# Patient Record
Sex: FEMALE | Race: WHITE
[De-identification: names, ages, dates, MRNs, and addresses within clinical notes are randomized per-mention and may not be internally consistent; named-entity substitution may affect disease eponyms.]

---

## 2017-01-12 ENCOUNTER — HOSPITAL ENCOUNTER (OUTPATIENT)
Dept: HOSPITAL 61 - SURG | Age: 57
Discharge: HOME | End: 2017-01-12
Attending: SURGERY
Payer: MEDICARE

## 2017-01-12 VITALS — BODY MASS INDEX: 21.83 KG/M2 | WEIGHT: 131 LBS | HEIGHT: 65 IN

## 2017-01-12 VITALS
DIASTOLIC BLOOD PRESSURE: 62 MMHG | SYSTOLIC BLOOD PRESSURE: 126 MMHG | SYSTOLIC BLOOD PRESSURE: 126 MMHG | DIASTOLIC BLOOD PRESSURE: 62 MMHG

## 2017-01-12 DIAGNOSIS — Z87.39: ICD-10-CM

## 2017-01-12 DIAGNOSIS — E03.9: ICD-10-CM

## 2017-01-12 DIAGNOSIS — Z72.89: ICD-10-CM

## 2017-01-12 DIAGNOSIS — J45.909: ICD-10-CM

## 2017-01-12 DIAGNOSIS — F17.210: ICD-10-CM

## 2017-01-12 DIAGNOSIS — K43.6: Primary | ICD-10-CM

## 2017-01-12 DIAGNOSIS — J44.9: ICD-10-CM

## 2017-01-12 PROCEDURE — C1781 MESH (IMPLANTABLE): HCPCS

## 2017-01-12 PROCEDURE — 49653: CPT

## 2017-01-12 RX ADMIN — MORPHINE SULFATE PRN MG: 2 INJECTION, SOLUTION INTRAMUSCULAR; INTRAVENOUS at 10:55

## 2017-01-12 RX ADMIN — HYDROMORPHONE HYDROCHLORIDE PRN MG: 2 INJECTION INTRAMUSCULAR; INTRAVENOUS; SUBCUTANEOUS at 11:30

## 2017-01-12 RX ADMIN — MORPHINE SULFATE PRN MG: 2 INJECTION, SOLUTION INTRAMUSCULAR; INTRAVENOUS at 10:31

## 2017-01-12 RX ADMIN — MORPHINE SULFATE PRN MG: 2 INJECTION, SOLUTION INTRAMUSCULAR; INTRAVENOUS at 10:36

## 2017-01-12 RX ADMIN — HYDROMORPHONE HYDROCHLORIDE PRN MG: 2 INJECTION INTRAMUSCULAR; INTRAVENOUS; SUBCUTANEOUS at 11:19

## 2017-01-12 NOTE — PDOC
BRIEF OPERATIVE NOTE


Date:  Jan 12, 2017


Pre-Op Diagnosis


Incarcerated ventral hernia


Post-Op Diagnosis


Same


Procedure Performed


Robotic assisted L/S Ventral hernia repair with Mesh


Surgeon


Maury


Anesthesia Type:  General


Blood Loss


5ml


Specimens Obtained


None


Findings


as above


Complications


None








RANDY VELAZQUEZ MD Jan 12, 2017 09:57

## 2017-01-12 NOTE — DISCH
DISCHARGE INSTRUCTIONS


Condition on Discharge


Condition on Discharge:  Stable





Activity After Discharge


Activity Instructions for Disc:  Avoid exertion


Other activity instructions:  No lifting >20lbs for 2 weeks





Diet after Discharge


Diet after Discharge:  Regular





Wound Incision Care


Other wound/incision instructi:  May shower in 24 hours





Contacting the  after DC


Call your doctor for:  If your condition worsens





Follow-Up


Follow up with:  Dr Velazquez in 2 weeks








RANDY VELAZQUEZ MD Jan 12, 2017 09:58

## 2017-01-12 NOTE — OP
DATE OF SURGERY:  01/12/2017



PREOPERATIVE DIAGNOSIS:  Incarcerated ventral hernia.



POSTOPERATIVE DIAGNOSIS:  Incarcerated ventral hernia.



NAME OF THE PROCEDURE:  Robotic-assisted laparoscopic ventral hernia repair with

mesh.



SURGEON:  Tommie Velazquez MD



INDICATIONS FOR THE PROCEDURE:  The patient is a 56-year-old female who has

complained of a bulge just above the umbilicus, becoming more painful and

larger.  Procedure of robotic-assisted laparoscopic ventral hernia repair was

explained to the patient in detail.  Risks and benefits were also discussed

including bleeding, infection, injury to intraabdominal contents, possibly

necessitating further open operations.  Alternatives of this procedure were also

discussed with the patient who seemed to understand and gave verbal and written

consent to have the procedure performed.



DESCRIPTION OF PROCEDURE:  The patient was taken to the operating room and

placed in the supine position.  General anesthesia was initiated.  Once the

patient was asleep and intubated, her abdomen was prepped and draped in the

usual sterile fashion using ChloraPrep.  An area in the left upper quadrant just

below the costal margin was injected with 0.25% Marcaine with epinephrine. 

Small incision was made with an 11 blade scalpel and a 5 mm Visiport was placed

under direct visualization into the abdomen.  Pneumoperitoneum was then

achieved.  The 5 mm camera was placed within the abdomen and the abdomen was

inspected.  No other abnormalities were noted.  It was noted there was about a 4

cm diameter hernia just above the umbilicus, incarcerated with omentum.  At this

point, three 8.5 mm da Rosemary ports were placed under direct visualization, 1 in

the left mid abdomen, 1 in the left lower abdomen and 1 in the left upper

abdomen.  At this point, the da Rosemary robot was brought in and docked at all

ports.  Camera port was placed and a grasper and EndoShears scissors were

placed.  Surgeon went to the da Rosemary console.  Using a grasper and EndoShears,

the adherent tissues to the hernia were taken down.  The hernia defect was then

closed with a nonabsorbable 2-0 V-Loc suture.  A VersaLite mesh was cut to the

diameter, 6 cm were placed over the defect.  This was sewn in place with a

running 2-0 V-Loc absorbable suture.  Once this was complete, the ports were

removed and pneumoperitoneum was reduced and the incisions were closed with a

4-0 subcuticular Monocryl.  Thierno Salazari-Strips, 4x4s and Medipore tape were

applied as dressings.  The patient was awakened, extubated in the operating

room, taken to recovery in stable condition.  All sponge and instrument counts

listed as correct.  Estimated blood loss 5 mL.

 



______________________________

TOMMIE VELAZQUEZ MD



DR:  MARK ANTHONY/marii  JOB#:  [id number] / [id number]

DD:  01/12/2017 09:56  DT:  01/12/2017 13:26



CHELSEA Lee

## 2019-04-15 ENCOUNTER — HOSPITAL ENCOUNTER (OUTPATIENT)
Dept: HOSPITAL 61 - MRI | Age: 59
Discharge: HOME | End: 2019-04-15
Payer: MEDICARE

## 2019-04-15 DIAGNOSIS — M41.86: ICD-10-CM

## 2019-04-15 DIAGNOSIS — M25.78: ICD-10-CM

## 2019-04-15 DIAGNOSIS — M51.35: Primary | ICD-10-CM

## 2019-04-15 DIAGNOSIS — M71.38: ICD-10-CM

## 2019-04-15 DIAGNOSIS — M47.816: ICD-10-CM

## 2019-04-15 DIAGNOSIS — M43.17: ICD-10-CM

## 2019-04-15 PROCEDURE — 72148 MRI LUMBAR SPINE W/O DYE: CPT

## 2019-04-15 NOTE — RAD
MRI Lumbar Spine without contrast

 

History: Worsening chronic low back pain bilaterally

 

Technique: Multiplanar, multi sequential noncontrast MR imaging was 

performed of the lumbar spine.

 

Comparison: None available at this time

 

Findings: 

There is minimal grade 1 anterior spondylolisthesis L5-S1 and L2-3, 

minimal posterior subluxation L1 relative to L2. Lumbar vertebral body 

stature is overall maintained. There is mild dextroscoliosis centered near

L2. There is very mild right lateral subluxation of L2 relative L3. There 

is variable fairly advanced degenerative disc disease at L1-L2 and L2-3, 

on the right at L3-4, and greater posteriorly at L4-5. There is mild 

degenerative disc disease at L5-S1 and moderate degenerative disc disease 

at T12-L1. Conus terminates at L1.  There is multilevel mild endplate 

edema greatest at L1-L2.

 

T12-L1: Spinal canal and neural foramina are adequate.

 

L1-L2:  There is minimal disc osteophyte complex greater in the far left 

lateral recess. There is mild narrowing of the left neural foramen, right 

neural foramen adequate. There is mild facet hypertrophic change.

 

L2-L3:  There is mild buckling of the ligamentum flavum and 

mild-to-moderate right greater than left facet degenerative change. There 

is mild deformity of the thecal sac. There is variable mild narrowing of 

the far lateral recesses bilaterally somewhat greater on the right. There 

is mild posterior narrowing of the left neural foramen, right neural 

foramen adequate.

 

L3-L4:  There is mild to moderate facet degenerative change greater on the

right. There is negligible disc osteophyte complex greater in the right 

lateral recess and inferior right neural foramen. Neural foramina are 

overall adequate. Spinal canal is adequate.

 

L4-L5:  There is mild buckling of the ligamentum flavum and facet 

hypertrophic change. There is negligible disc osteophyte complex and 

bulge. Spinal canal is overall adequate. Neural foramina are adequate.

 

L5-S1:  There is moderate facet hypertrophic change. There is a synovial 

cyst along the lateral left facet articulation, does not protrude into the

spinal canal. There is negligible bulge. Spinal canal is adequate. Neural 

foramina are adequate.

 

Impression: 

 

1.  There is multilevel mild abnormal alignment as stated. There is mild 

dextroscoliosis. There is multilevel facet degenerative change. There is 

multilevel fairly advanced degenerative disc disease. There is no 

significant lumbar spinal stenosis, variable mild narrowing of the far 

lateral recesses bilaterally at L2-3. There is no significant lumbar 

neural foramina compromise.

 

Electronically signed by: Helio Rosen MD (4/15/2019 4:11 PM) 

[facility]

## 2019-10-22 ENCOUNTER — HOSPITAL ENCOUNTER (EMERGENCY)
Dept: HOSPITAL 63 - ER | Age: 59
Discharge: HOME | End: 2019-10-22
Payer: MEDICARE

## 2019-10-22 VITALS — HEIGHT: 64 IN | BODY MASS INDEX: 18.44 KG/M2 | WEIGHT: 108 LBS

## 2019-10-22 VITALS — SYSTOLIC BLOOD PRESSURE: 128 MMHG | DIASTOLIC BLOOD PRESSURE: 71 MMHG

## 2019-10-22 DIAGNOSIS — M79.671: ICD-10-CM

## 2019-10-22 DIAGNOSIS — M06.9: ICD-10-CM
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
## 2019-10-22 DIAGNOSIS — S16.1XXA: Primary | ICD-10-CM

## 2019-10-22 DIAGNOSIS — M19.90: ICD-10-CM

## 2019-10-22 DIAGNOSIS — J44.9: ICD-10-CM

## 2019-10-22 DIAGNOSIS — S09.8XXA: ICD-10-CM
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
## 2019-10-22 PROCEDURE — 94640 AIRWAY INHALATION TREATMENT: CPT

## 2019-10-22 PROCEDURE — 70450 CT HEAD/BRAIN W/O DYE: CPT

## 2019-10-22 PROCEDURE — 72125 CT NECK SPINE W/O DYE: CPT

## 2019-10-22 PROCEDURE — 73630 X-RAY EXAM OF FOOT: CPT

## 2019-10-22 PROCEDURE — 99284 EMERGENCY DEPT VISIT MOD MDM: CPT

## 2019-10-22 NOTE — RAD
Exam: Right foot 3 views

 

INDICATION: Fall, history of fracture and arthritis many years ago

 

TECHNIQUE: Frontal, lateral and oblique views of the right foot

 

Comparisons: None

 

FINDINGS:

There is severe degenerative change at the first TMT and second TMT 

joints. Moderate overlying soft tissue swelling at the forefoot, with 

irregular mineralization of the underlying tarsal metatarsal joints. No 

displaced fractures are identified. Joint spaces are otherwise 

well-maintained.

 

IMPRESSION:

Soft tissue swelling overlying the first and second metatarsal, with 

severe degenerative changes at the underlying joints. A displaced fracture

is not identified however one is difficult to exclude on radiographs. 

Recommend comparison to any prior imaging if available versus progress 

radiographs to assess for healing changes.

 

Electronically signed by: Lexx Jordan MD (10/22/2019 3:39 PM) [facility]

## 2019-10-22 NOTE — RAD
Exam: CT head and cervical spine

 

INDICATION: Fall

 

TECHNIQUE: Sequential axial images through the head and cervical spine 

were obtained without the administration of IV contrast.

 

Comparisons: None

 

FINDINGS:

 

Head:

No focal parenchymal lesion or hemorrhage is identified. There is no 

midline shift or sulcal effacement.

 

No acute vascular territory infarction is identified. Gray-white 

distinction is preserved.

 

The ventricular system is within normal limits without compression 

hydrocephalus. The basal cisterns are well maintained.

 

Extra cranial soft tissue contusion overlying the left temporal region 

over the zygomatic arch. The visualized portions of the paranasal sinuses 

and mastoid air cells are well-pneumatized. No acute fractures.

 

Cervical spine:

Vertebral body heights are well-maintained. There is a grade 1 

anterolisthesis of C2 on C3. Grade 1 anterolisthesis of C6 on C7.

 

Fracture to the cervical spine is not identified.

 

Multilevel spondylotic change in the cervical spine with degenerative disc

disease greatest at C3-C4, C4-C5 and C5-C6.

 

Visualized paraspinal soft tissues are unremarkable.

 

IMPRESSION:

1.  Extracranial soft tissue contusion overlying the left temporal region 

without underlying osseous or intracranial abnormality.

2.  Negative CT C-spine for acute traumatic injury.

 

Exposure: One or more of the following in the visualized dose reduction 

techniques were utilized for this examination:

1.  Automated exposure control

2.  Adjustment of the MA and/or KV according to patient size

Use of iterative of reconstructive technique

 

Electronically signed by: Lexx Jordan MD (10/22/2019 3:24 PM) [facility]

## 2019-10-22 NOTE — PHYS DOC
Past History


Past Medical History:  Arthritis, Asthma, COPD, Other


Additional Past Medical Histor:  something pt calls "precurser to parkinsons" 

caused by gabapentin


Past Surgical History:  No Surgical History


Alcohol Use:  Occasionally


Drug Use:  None





Adult General


Chief Complaint


Chief Complaint:  MECHANICAL FALL





HPI


HPI





Patient is a 59-year-old female who arrives via EMS with report of fall with 

head injury. Patient denies any loss of consciousness but did strike her head. 

She also complains of neck pain. Patient rates pain to be a 9 out of 10. Patient

also indicates that she has pain in her right foot that is more than usual but 

does not think that she injured her foot. She does have a history of rheumatoid 

arthritis. Patient states that nothing is improving her symptoms.[]





Review of Systems


Review of Systems





Constitutional: Denies fever or chills []


Eyes: Denies change in visual acuity, redness, or eye pain []


HENT: Denies nasal congestion or sore throat []


Respiratory: Denies cough or shortness of breath []


Cardiovascular: No additional information not addressed in HPI []


GI: Denies abdominal pain, nausea, vomiting, bloody stools or diarrhea []


: Denies dysuria or hematuria []


Musculoskeletal: Denies back pain or joint pain []


Integument: Denies rash or skin lesions []


Neurologic: Denies headache, focal weakness or sensory changes []


Endocrine: Denies polyuria or polydipsia []





All other systems were reviewed and found to be within normal limits, except as 

documented in this note.





Current Medications


Current Medications





Current Medications








 Medications


  (Trade)  Dose


 Ordered  Sig/Chloe  Start Time


 Stop Time Status Last Admin


Dose Admin


 


 Albuterol Sulfate


  (Ventolin)  2.5 mg  1X  ONCE  10/22/19 14:45


 10/22/19 14:56 DC 10/22/19 14:45


2.5 MG











Allergies


Allergies





Allergies








Coded Allergies Type Severity Reaction Last Updated Verified


 


  No Known Drug Allergies    10/22/19 No











Physical Exam


Physical Exam





Constitutional: Well developed, well nourished, no acute distress, non-toxic 

appearance. []


HENT: Normocephalic, atraumatic, bilateral external ears normal, oropharynx 

moist, no oral exudates, nose normal. []


Eyes: PERRLA, EOMI, conjunctiva normal, no discharge. [] 


Neck: Normal range of motion, no tenderness, supple, no stridor. [] 


Cardiovascular:Heart rate regular rhythm, no murmur []


Lungs & Thorax:  Bilateral breath sounds clear to auscultation []


Abdomen: Bowel sounds normal, soft, no tenderness, no masses, no pulsatile 

masses. [] 


Skin: Warm, dry, no erythema, no rash. [] 


Back: No tenderness, no CVA tenderness. [] 


Extremities: No tenderness, no cyanosis, no clubbing, ROM intact, no edema. [] 


Neurologic: Alert and oriented X 3, normal motor function, normal sensory 

function, no focal deficits noted. []


Psychologic: Affect normal, judgement normal, mood normal. []





Current Patient Data


Vital Signs





                                   Vital Signs








  Date Time  Temp Pulse Resp B/P (MAP) Pulse Ox O2 Delivery O2 Flow Rate FiO2


 


10/22/19 15:22  81 18 126/66 (86) 96 Room Air  


 


10/22/19 14:34 97.9       











EKG


EKG


[]





Radiology/Procedures


Radiology/Procedures


[]





Course & Med Decision Making


Course & Med Decision Making


Pertinent Labs and Imaging studies reviewed. (See chart for details)





[]





Dragon Disclaimer


Dragon Disclaimer


This electronic medical record was generated, in whole or in part, using a voice

 recognition dictation system.





Departure


Departure:


Impression:  


   Primary Impression:  


   Closed head injury


   Additional Impression:  


   Cervical myofascial strain


Disposition:  01 HOME, SELF-CARE


Condition:  STABLE


Referrals:  


CHELSEA SWANSON (PCP)


Patient Instructions:  Cervical Sprain, Form - Excuse from Work, School, or 

Physical Activity, Head Injury, Adult


Scripts


Orphenadrine Citrate (ORPHENADRINE CITRATE) 100 Mg Tablet.er


1 TAB PO BID PRN for MUSCLE SPASMS, #14 TAB


   Prov: HONORIO WALSH Jr. DO         10/22/19 


Acetaminophen With Codeine (TYLENOL WITH CODEINE #3 TABLET) 1 Each Tablet


1 TAB PO PRN Q6HRS PRN for pain MDD 4 Tablet(s), #12 TAB 0 Refills


   Prov: HONORIO WALSH Jr. DO         10/22/19





Problem Qualifiers








   Primary Impression:  


   Closed head injury


   Encounter type:  initial encounter  Qualified Codes:  S09.90XA - Unspecified 

   injury of head, initial encounter


   Additional Impression:  


   Cervical myofascial strain


   Encounter type:  initial encounter  Qualified Codes:  S16.1XXA - Strain of 

   muscle, fascia and tendon at neck level, initial encounter








HONORIO WALSH Jr. DO          Oct 22, 2019 16:25

## 2019-10-31 ENCOUNTER — HOSPITAL ENCOUNTER (EMERGENCY)
Dept: HOSPITAL 63 - ER | Age: 59
Discharge: HOME | End: 2019-10-31
Payer: MEDICARE

## 2019-10-31 VITALS — DIASTOLIC BLOOD PRESSURE: 89 MMHG | SYSTOLIC BLOOD PRESSURE: 146 MMHG

## 2019-10-31 VITALS — BODY MASS INDEX: 18.33 KG/M2 | WEIGHT: 107.37 LBS | HEIGHT: 64 IN

## 2019-10-31 DIAGNOSIS — M25.512: ICD-10-CM
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
## 2019-10-31 DIAGNOSIS — R74.8: ICD-10-CM
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
## 2019-10-31 DIAGNOSIS — F15.90: ICD-10-CM

## 2019-10-31 DIAGNOSIS — G89.29: ICD-10-CM

## 2019-10-31 DIAGNOSIS — Z91.81: ICD-10-CM

## 2019-10-31 DIAGNOSIS — R73.02: ICD-10-CM

## 2019-10-31 DIAGNOSIS — M25.572: ICD-10-CM

## 2019-10-31 DIAGNOSIS — M19.90: ICD-10-CM

## 2019-10-31 DIAGNOSIS — E87.6: ICD-10-CM

## 2019-10-31 DIAGNOSIS — N39.0: ICD-10-CM

## 2019-10-31 DIAGNOSIS — M79.672: ICD-10-CM
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
## 2019-10-31 DIAGNOSIS — F17.210: ICD-10-CM

## 2019-10-31 DIAGNOSIS — J44.9: ICD-10-CM

## 2019-10-31 DIAGNOSIS — F11.90: ICD-10-CM
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
## 2019-10-31 DIAGNOSIS — S05.12XA: Primary | ICD-10-CM

## 2019-10-31 LAB
ALBUMIN SERPL-MCNC: 3.6 G/DL (ref 3.4–5)
ALBUMIN/GLOB SERPL: 1.1 {RATIO} (ref 1–1.7)
ALP SERPL-CCNC: 125 U/L (ref 46–116)
ALT SERPL-CCNC: 27 U/L (ref 14–59)
AMPHETAMINE/METHAMPHETAMINE: (no result)
ANION GAP SERPL CALC-SCNC: 9 MMOL/L (ref 6–14)
APTT PPP: YELLOW S
AST SERPL-CCNC: 23 U/L (ref 15–37)
BACTERIA #/AREA URNS HPF: (no result) /HPF
BARBITURATES UR-MCNC: (no result) UG/ML
BASOPHILS # BLD AUTO: 0.1 X10^3/UL (ref 0–0.2)
BASOPHILS NFR BLD: 1 % (ref 0–3)
BENZODIAZ UR-MCNC: (no result) UG/L
BILIRUB SERPL-MCNC: 0.5 MG/DL (ref 0.2–1)
BILIRUB UR QL STRIP: (no result)
BUN/CREAT SERPL: 14 (ref 6–20)
CA-I SERPL ISE-MCNC: 11 MG/DL (ref 7–20)
CALCIUM SERPL-MCNC: 8.5 MG/DL (ref 8.5–10.1)
CANNABINOIDS UR-MCNC: (no result) UG/L
CHLORIDE SERPL-SCNC: 98 MMOL/L (ref 98–107)
CO2 SERPL-SCNC: 30 MMOL/L (ref 21–32)
COCAINE UR-MCNC: (no result) NG/ML
CREAT SERPL-MCNC: 0.8 MG/DL (ref 0.6–1)
EOSINOPHIL NFR BLD: 0.3 X10^3/UL (ref 0–0.7)
EOSINOPHIL NFR BLD: 4 % (ref 0–3)
ERYTHROCYTE [DISTWIDTH] IN BLOOD BY AUTOMATED COUNT: 15.1 % (ref 11.5–14.5)
FIBRINOGEN PPP-MCNC: (no result) MG/DL
GFR SERPLBLD BASED ON 1.73 SQ M-ARVRAT: 73.4 ML/MIN
GLOBULIN SER-MCNC: 3.3 G/DL (ref 2.2–3.8)
GLUCOSE SERPL-MCNC: 120 MG/DL (ref 70–99)
GLUCOSE UR STRIP-MCNC: (no result) MG/DL
HCT VFR BLD CALC: 38.7 % (ref 36–47)
HGB BLD-MCNC: 12.8 G/DL (ref 12–15.5)
LIPASE: 72 U/L (ref 73–393)
LYMPHOCYTES # BLD: 1.4 X10^3/UL (ref 1–4.8)
LYMPHOCYTES NFR BLD AUTO: 17 % (ref 24–48)
MAGNESIUM SERPL-MCNC: 2 MG/DL (ref 1.8–2.4)
MCH RBC QN AUTO: 30 PG (ref 25–35)
MCHC RBC AUTO-ENTMCNC: 33 G/DL (ref 31–37)
MCV RBC AUTO: 91 FL (ref 79–100)
METHADONE SERPL-MCNC: (no result) NG/ML
MONO #: 0.6 X10^3/UL (ref 0–1.1)
MONOCYTES NFR BLD: 8 % (ref 0–9)
NEUT #: 5.8 X10^3UL (ref 1.8–7.7)
NEUTROPHILS NFR BLD AUTO: 70 % (ref 31–73)
NITRITE UR QL STRIP: (no result)
OPIATES UR-MCNC: (no result) NG/ML
PCP SERPL-MCNC: (no result) MG/DL
PLATELET # BLD AUTO: 385 X10^3/UL (ref 140–400)
POTASSIUM SERPL-SCNC: 3.4 MMOL/L (ref 3.5–5.1)
PROT SERPL-MCNC: 6.9 G/DL (ref 6.4–8.2)
RBC # BLD AUTO: 4.27 X10^6/UL (ref 3.5–5.4)
RBC #/AREA URNS HPF: (no result) /HPF (ref 0–2)
SODIUM SERPL-SCNC: 137 MMOL/L (ref 136–145)
SP GR UR STRIP: <=1.005
SQUAMOUS #/AREA URNS LPF: (no result) /LPF
UROBILINOGEN UR-MCNC: 0.2 MG/DL
WBC # BLD AUTO: 8.2 X10^3/UL (ref 4–11)
WBC #/AREA URNS HPF: (no result) /HPF (ref 0–4)

## 2019-10-31 PROCEDURE — 72125 CT NECK SPINE W/O DYE: CPT

## 2019-10-31 PROCEDURE — 96375 TX/PRO/DX INJ NEW DRUG ADDON: CPT

## 2019-10-31 PROCEDURE — 81001 URINALYSIS AUTO W/SCOPE: CPT

## 2019-10-31 PROCEDURE — 84484 ASSAY OF TROPONIN QUANT: CPT

## 2019-10-31 PROCEDURE — 85610 PROTHROMBIN TIME: CPT

## 2019-10-31 PROCEDURE — 80053 COMPREHEN METABOLIC PANEL: CPT

## 2019-10-31 PROCEDURE — 71046 X-RAY EXAM CHEST 2 VIEWS: CPT

## 2019-10-31 PROCEDURE — 36415 COLL VENOUS BLD VENIPUNCTURE: CPT

## 2019-10-31 PROCEDURE — 96376 TX/PRO/DX INJ SAME DRUG ADON: CPT

## 2019-10-31 PROCEDURE — 87086 URINE CULTURE/COLONY COUNT: CPT

## 2019-10-31 PROCEDURE — 83880 ASSAY OF NATRIURETIC PEPTIDE: CPT

## 2019-10-31 PROCEDURE — 99285 EMERGENCY DEPT VISIT HI MDM: CPT

## 2019-10-31 PROCEDURE — 96365 THER/PROPH/DIAG IV INF INIT: CPT

## 2019-10-31 PROCEDURE — 83735 ASSAY OF MAGNESIUM: CPT

## 2019-10-31 PROCEDURE — 80307 DRUG TEST PRSMV CHEM ANLYZR: CPT

## 2019-10-31 PROCEDURE — 83690 ASSAY OF LIPASE: CPT

## 2019-10-31 PROCEDURE — 85730 THROMBOPLASTIN TIME PARTIAL: CPT

## 2019-10-31 PROCEDURE — 85025 COMPLETE CBC W/AUTO DIFF WBC: CPT

## 2019-10-31 PROCEDURE — 73030 X-RAY EXAM OF SHOULDER: CPT

## 2019-10-31 PROCEDURE — 70450 CT HEAD/BRAIN W/O DYE: CPT

## 2019-10-31 RX ADMIN — MORPHINE SULFATE PRN MG: 2 INJECTION, SOLUTION INTRAMUSCULAR; INTRAVENOUS at 17:41

## 2019-10-31 RX ADMIN — MORPHINE SULFATE PRN MG: 2 INJECTION, SOLUTION INTRAMUSCULAR; INTRAVENOUS at 20:24

## 2019-10-31 NOTE — RAD
Study: SHOULDER 2+V LEFT

 

Indication: Pain after a fall.

 

Comparison: None.

 

Findings:

 

Upper limits of normal width of the AC joint. Soft tissue fullness within 

the AC joint itself as well as extending above the AC joint.

 

The humeral head is high riding but not dislocated. No acute fracture. 

 

Impression:

 

1. No acute fracture.

2. High riding humeral head which approaches the undersurface of the 

acromion. Recommend correlation for symptoms of rotator cuff deficiency.

3. Borderline widened AC joint with surrounding soft tissue fullness. This

could be secondary to sprain or be degenerative in etiology with capsular 

hypertrophy.

 

Electronically signed by: JASON CHANG MD (10/31/2019 6:29 PM) [facility]

## 2019-10-31 NOTE — RAD
STUDY: CT head and cervical spine without contrast

 

INDICATION: Pain after a fall.

 

COMPARISON: CT head and cervical spine from 10/22/2019

 

TECHNIQUE: Axial CT imaging through the head and cervical spine without 

the use of intravenous contrast. Sagittal and coronal reformats were 

obtained.

 

One or more of the following individualized dose reduction techniques were

utilized for this examination:  

 

1. Automated exposure control

2. Adjustment of the mA and/or kV according to patient size

3. Use of iterative reconstruction technique.

 

FINDINGS:

 

CT head:

 

Previously noted contusion lateral to the left orbit has decreased in 

size. No newly seen scalp abnormality and the orbits/globes are 

unremarkable. The calvarium is intact. The visualized paranasal sinuses 

are well aerated. The mastoid air cells and middle ears are normally 

aerated as well.

 

No acute intracranial hemorrhage. No mass effect, midline shift or 

hydrocephalus. Gray-white matter differentiation is maintained. 

Redemonstrated patchy low-attenuation such as seen in the left frontal 

subcortical white matter.

 

CT cervical spine:

 

No acute fracture seen throughout the cervical or upper thoracic spine. 

Alignment is maintained at the craniocervical and atlantoaxial 

articulations. The dens is intact.

 

Unchanged grade 1 anterolisthesis of C6 on C7. Multilevel discogenic 

arthrosis most advanced from C3-C4 through C5-C6. Multilevel facet 

degeneration, uncovertebral joint hypertrophy and disc osteophyte complex 

formation. This results in multiple levels with bony neural foraminal 

encroachment ranging mostly from mild to moderate. Central canal 

encroachment is again seen to be most pronounced at C3-C4 and moderate in 

severity.

 

No prevertebral edema.

 

IMPRESSION:

 

CT head:

1.  No acute intracranial abnormality. Previously seen contusion lateral 

to the left orbit has decreased in size.

2.  Nonspecific white matter findings most notable at the left frontal 

lobe which likely represent the sequela of chronic microvascular ischemic 

change.

 

CT cervical spine:

1.  No acute fracture. No change in alignment relative to the 10/22/2019 

comparison.

2.  Unchanged cervical spondylosis with suspected moderate central canal 

stenosis at C3-C4 and multiple levels with mild and moderate bony neural 

foraminal encroachment.

 

Electronically signed by: JASON CHANG MD (10/31/2019 6:18 PM) [facility]

## 2019-10-31 NOTE — PHYS DOC
Past History


Past Medical History:  Arthritis, Asthma, COPD, Other


Additional Past Medical Histor:  something pt calls "precurser to parkinsons" 

caused by gabapentin


 (VIVI MARTINES DO)


Past Surgical History:  No Surgical History


 (VIVI MARTINES DO)


Smoking:  Cigarettes


Alcohol Use:  Occasionally


Drug Use:  None


 (VIVI MARTINES DO)





Adult General


Chief Complaint


Chief Complaint:  MECHANICAL FALL





HPI


HPI





Patient is a 59-year-old female presents with left year, left head, left neck, 

and left shoulder pain after a fall 2-3 days ago. Patient has frequent falls as 

a long-standing side effect from gabapentin. She notes that she has been feeling

increasingly weak over these past several days as well. Patient was seen 

approximately a week and a half ago for another fall where she injured the left 

side of her head. Imaging performed at that time was negative and she was 

discharged to home. She continues to have the previous pain also in her foot and

ankle which is not significantly improved over time. She denies any chest pain 

or palpitations.[]


 (VIVI MARTINES DO)





Review of Systems


Review of Systems





Constitutional: Denies fever or chills []


Eyes: Denies change in visual acuity, redness, or eye pain []


HENT: Denies nasal congestion or sore throat []


Respiratory: Denies cough or shortness of breath []


Cardiovascular: No chest pain or palpitations[]


GI: Denies abdominal pain, nausea, vomiting, bloody stools or diarrhea []


: Denies dysuria or hematuria []


Musculoskeletal: Denies back pain see history of present illness[]


Integument: Denies rash or skin lesions []


Neurologic: Denies focal weakness or sensory changes, see history of present 

illness []


Endocrine: Denies polyuria or polydipsia []





All other systems were reviewed and found to be within normal limits, except as 

documented in this note.


 (VIVI MARTINES DO)





Allergies


Allergies





Allergies








Coded Allergies Type Severity Reaction Last Updated Verified


 


  No Known Drug Allergies    10/22/19 No








 (VIVI MARTINES DO)





Physical Exam


Physical Exam





Constitutional: Well developed, well nourished, no acute distress, non-toxic 

appearance. []


HENT: Normocephalic, bruising on the left side of her face. TMs are clear, no 

blood or fluid, no Crockett sign, bilateral external ears normal, oropharynx 

moist, no oral exudates, nose normal. []


Eyes: PERRLA, EOMI, conjunctiva normal, no discharge. [] 


Neck: Normal range of motion, no midline tenderness, tenderness in the left-si

ded cervical paraspinal musculature., supple, no stridor. [] 


Cardiovascular:Heart rate regular rhythm, no murmur []


Lungs & Thorax:  Bilateral breath sounds clear to auscultation []


Abdomen: Bowel sounds normal, soft, no tenderness, no masses, no pulsatile 

masses. [] 


Skin: Warm, dry, no erythema, no rash. [] 


Back: No tenderness, no CVA tenderness. [] 


Extremities: Left shoulder has diffuse tenderness to palpation. Decreased active

 range of motion. She is distally neurovascularly intact. No pain with axial 

load of the humerus. Full active range of motion of the wrist and fingers and 

elbow.


Mild tenderness of the left foot and ankle region. Patient reports that this is 

unchanged from last visit.


The other 2 extremities show: No tenderness, no cyanosis, no clubbing, ROM 

intact, no edema. [] 


Neurologic: Alert and oriented X 3, normal motor function, normal sensory 

function, no focal deficits noted. Resting twitching/tremor noted. []


Psychologic: Affect normal, judgement normal, mood normal. []


 ([facility])





Current Patient Data


Vital Signs





                                   Vital Signs








  Date Time  Temp Pulse Resp B/P (MAP) Pulse Ox O2 Delivery O2 Flow Rate FiO2


 


10/31/19 16:45 98.7 82 20  98 Room Air  








 ([facility])





EKG


EKG


EKG shows a supraventricular rhythm/sinus rhythm at 72 bpm, normal axis, QTC is 

elevated at 497 ms. This was interpreted by me at 1749. No ST elevation.[]


 ([facility] )





Radiology/Procedures


Radiology/Procedures


[]


 ([facility])


Radiology/Procedures


[facility] [address], [place] [address]


                                 [phone]


                                        


                                 IMAGING REPORT





                                     Signed





PATIENT: CYNTHIA EWING ACCOUNT: [id number]     MRN#: [id number]


: 1960           LOCATION: ER              AGE: 59


SEX: F                    EXAM DT: 10/31/19         ACCESSION#: [id number]


STATUS: REG ER            ORD. PHYSICIAN: VIVI MARTINES DO


REASON: pain post fall, weakness


PROCEDURE: CHEST PA & LATERAL





Study: CHEST PA   LATERAL


 


Indication: Pain and weakness after a fall.


 


Comparison: None.


 


Findings:


 


Prominent hiatal hernia. The cardiomediastinal silhouette is at the upper 


limits of normal for size.


 


Increased interstitial markings bilaterally. Ill-defined haziness at the 


periphery of both lung bases. No lobar consolidation, pleural effusion or 


pneumothorax.


 


No free air seen under the diaphragm.


 


Impression:


 


1. Increased interstitial markings bilaterally and ill-defined haziness at


the periphery of both lung bases. The appearance is nonspecific but can be


seen with chronic parenchymal lung changes especially if there is a 


smoking history. No findings to suggest an organizing pneumonia.


2. Prominent hiatal hernia.


3. Upper limits of normal size of the cardiomediastinal silhouette.


 


Electronically signed by: JASON CHANG MD (10/31/2019 6:25 PM) [facility]














DICTATED AND SIGNED BY:     JASON CHANG MD


DATE:     10/31/19 1825





CC: VIVI MARTINES DO; CHELSEA SWANSON ~


                               [facility] [address], [place] [address]


                                 [phone]


                                        


                                 IMAGING REPORT





                                     Signed





PATIENT: CYNTHIA EWING ACCOUNT: [id number]     MRN#: [id number]


: 1960           LOCATION: ER              AGE: 59


SEX: F                    EXAM DT: 10/31/19         ACCESSION#: [id number]


STATUS: REG ER            ORD. PHYSICIAN: VIVI MARTINES DO


REASON: pain post fall,


PROCEDURE: SHOULDER 2+V LEFT





Study: SHOULDER 2+V LEFT


 


Indication: Pain after a fall.


 


Comparison: None.


 


Findings:


 


Upper limits of normal width of the AC joint. Soft tissue fullness within 


the AC joint itself as well as extending above the AC joint.


 


The humeral head is high riding but not dislocated. No acute fracture. 


 


Impression:


 


1. No acute fracture.


2. High riding humeral head which approaches the undersurface of the 


acromion. Recommend correlation for symptoms of rotator cuff deficiency.


3. Borderline widened AC joint with surrounding soft tissue fullness. This


could be secondary to sprain or be degenerative in etiology with capsular 


hypertrophy.


 


Electronically signed by: JASON CHANG MD (10/31/2019 6:29 PM) [facility]














DICTATED AND SIGNED BY:     JASON CHANG MD


DATE:     10/31/19 1829





CC: VIVI MARTINES DO; CHELSEA SWANSON ~


                               [facility] [address], [place] [address]


                                 [phone]


                                        


                                 IMAGING REPORT





                                     Signed





PATIENT: CYNTHIA EWING ACCOUNT: [id number]     MRN#: [id number]


: 1960           LOCATION: ER              AGE: 59


SEX: F                    EXAM DT: 10/31/19         ACCESSION#: [id number]


STATUS: REG ER            ORD. PHYSICIAN: VIVI MARTINES DO


REASON: pain post fall


PROCEDURE: CT HEAD AND CERVICAL SPINE WO





STUDY: CT head and cervical spine without contrast


 


INDICATION: Pain after a fall.


 


COMPARISON: CT head and cervical spine from 10/22/2019


 


TECHNIQUE: Axial CT imaging through the head and cervical spine without 


the use of intravenous contrast. Sagittal and coronal reformats were 


obtained.


 


One or more of the following individualized dose reduction techniques were


utilized for this examination:  


 


1. Automated exposure control


2. Adjustment of the mA and/or kV according to patient size


3. Use of iterative reconstruction technique.


 


FINDINGS:


 


CT head:


 


Previously noted contusion lateral to the left orbit has decreased in 


size. No newly seen scalp abnormality and the orbits/globes are 


unremarkable. The calvarium is intact. The visualized paranasal sinuses 


are well aerated. The mastoid air cells and middle ears are normally 


aerated as well.


 


No acute intracranial hemorrhage. No mass effect, midline shift or 


hydrocephalus. Gray-white matter differentiation is maintained. 


Redemonstrated patchy low-attenuation such as seen in the left frontal 


subcortical white matter.


 


CT cervical spine:


 


No acute fracture seen throughout the cervical or upper thoracic spine. 


Alignment is maintained at the craniocervical and atlantoaxial 


articulations. The dens is intact.


 


Unchanged grade 1 anterolisthesis of C6 on C7. Multilevel discogenic 


arthrosis most advanced from C3-C4 through C5-C6. Multilevel facet 


degeneration, uncovertebral joint hypertrophy and disc osteophyte complex 


formation. This results in multiple levels with bony neural foraminal 


encroachment ranging mostly from mild to moderate. Central canal 


encroachment is again seen to be most pronounced at C3-C4 and moderate in 


severity.


 


No prevertebral edema.


 


IMPRESSION:


 


CT head:


1.  No acute intracranial abnormality. Previously seen contusion lateral 


to the left orbit has decreased in size.


2.  Nonspecific white matter findings most notable at the left frontal 


lobe which likely represent the sequela of chronic microvascular ischemic 


change.


 


CT cervical spine:


1.  No acute fracture. No change in alignment relative to the 10/22/2019 


comparison.


2.  Unchanged cervical spondylosis with suspected moderate central canal 


stenosis at C3-C4 and multiple levels with mild and moderate bony neural 


foraminal encroachment.


 


Electronically signed by: JASON CHANG MD (10/31/2019 6:18 PM) [facility]














DICTATED AND SIGNED BY:     JASON CHANG MD


DATE:     10/31/19 1818





CC: VIVI MARTINES DO; CHELSEA SWANSON ~


                               [facility] [address], [place] [address]


                                 [phone]


                                        


                                 IMAGING REPORT





                                     Signed





PATIENT: CYNTHIA EWING ACCOUNT: [id number]     MRN#: [id number]


: 1960           LOCATION: ER              AGE: 59


SEX: F                    EXAM DT: 10/31/19         ACCESSION#: [id number]


STATUS: REG ER            ORD. PHYSICIAN: VIVI MARTINES DO


REASON: pain post fall


PROCEDURE: CT HEAD AND CERVICAL SPINE WO





STUDY: CT head and cervical spine without contrast


 


INDICATION: Pain after a fall.


 


COMPARISON: CT head and cervical spine from 10/22/2019


 


TECHNIQUE: Axial CT imaging through the head and cervical spine without 


the use of intravenous contrast. Sagittal and coronal reformats were 


obtained.


 


One or more of the following individualized dose reduction techniques were


utilized for this examination:  


 


1. Automated exposure control


2. Adjustment of the mA and/or kV according to patient size


3. Use of iterative reconstruction technique.


 


FINDINGS:


 


CT head:


 


Previously noted contusion lateral to the left orbit has decreased in 


size. No newly seen scalp abnormality and the orbits/globes are 


unremarkable. The calvarium is intact. The visualized paranasal sinuses 


are well aerated. The mastoid air cells and middle ears are normally 


aerated as well.


 


No acute intracranial hemorrhage. No mass effect, midline shift or 


hydrocephalus. Gray-white matter differentiation is maintained. 


Redemonstrated patchy low-attenuation such as seen in the left frontal 


subcortical white matter.


 


CT cervical spine:


 


No acute fracture seen throughout the cervical or upper thoracic spine. 


Alignment is maintained at the craniocervical and atlantoaxial 


articulations. The dens is intact.


 


Unchanged grade 1 anterolisthesis of C6 on C7. Multilevel discogenic 


arthrosis most advanced from C3-C4 through C5-C6. Multilevel facet 


degeneration, uncovertebral joint hypertrophy and disc osteophyte complex 


formation. This results in multiple levels with bony neural foraminal 


encroachment ranging mostly from mild to moderate. Central canal 


encroachment is again seen to be most pronounced at C3-C4 and moderate in 


severity.


 


No prevertebral edema.


 


IMPRESSION:


 


CT head:


1.  No acute intracranial abnormality. Previously seen contusion lateral 


to the left orbit has decreased in size.


2.  Nonspecific white matter findings most notable at the left frontal 


lobe which likely represent the sequela of chronic microvascular ischemic 


change.


 


CT cervical spine:


1.  No acute fracture. No change in alignment relative to the 10/22/2019 


comparison.


2.  Unchanged cervical spondylosis with suspected moderate central canal 


stenosis at C3-C4 and multiple levels with mild and moderate bony neural 


foraminal encroachment.


 


Electronically signed by: JASON CHANG MD (10/31/2019 6:18 PM) [facility]














DICTATED AND SIGNED BY:     JASON CHANG MD


DATE:     10/31/19 1818





CC: VIVI MARTINES DO; CHELSEA SWANSON ~





 (DUY LIZAMA MD)


Course & Med Decision Making


Course & Med Decision Making


Pertinent Labs and Imaging studies reviewed. (See chart for details)





ED course: Patient arrived, was placed in bed, and tolerated exam well. At the 

time of this dictation, laboratory and imaging studies are pending. Patient care

 was endorsed to the nighttime physician at 1800 with these and process.[]


 (VIVI MARTINES DO)


Course & Med Decision Making


Impression:





1. Frequent Falls


2. Contusions


3. Arthritis 


4. Mild Hypokalemia 3.4


5. Mild Elevation Glucose


6. Tobacco Use


7. Hx Chronic Pain


8. Mild Elevation Alk Phos.


9. Bilateral Basilar interstitial infiltrate/ atelectasis


10.UTI


11. Possible Drug Interaction- muscle relaxers, oxycodone, gabapentin


12. Tobacco Use 


13. Urine Drug screen + opioid  and methamphetamine








Recommended pt. to follow up with primary and neurology.  Pt. also to [facility] 

with [facility].   Pt. Strongly recommend to use a walker.  Pt. elects to

 be discharge home.  Declines admission for neuro observation and further eval. 

at this time.  Pt. to use ice packs as needed. Push fruit juices. Follow up 

urine cultures.  Take Keflex 500 three times a day for UTI


 (DUY LIZAMA MD)


Dragon Disclaimer


Dragon Disclaimer


This electronic medical record was generated, in whole or in part, using a voice

 recognition dictation system.


 (VIVI MARTINES DO)





Departure


Departure:


Disposition:   HOME/RESIDENCE PRIOR TO ADM


Condition:  STABLE


Referrals:  


CHELSEA SWANSON (PCP)


Scripts


Cephalexin (KEFLEX) 500 Mg Capsule


500 MG PO TID for UTI, , #10 BOTTLE


   Prov: DUY LIZAMA MD         10/31/19





Dragon Disclaimer


This chart was dictated in whole or in part using Voice Recognition software in 

a busy, high-work load, and often noisy Emergency Department environment.  It 

may contain unintended and wholly unrecognized errors or omissions.


 (DUY LIZAMA MD)











VIVI MARTINES DO          Oct 31, 2019 17:39


DUY LIZAMA MD           Oct 31, 2019 19:20

## 2019-10-31 NOTE — RAD
Study: CHEST PA   LATERAL

 

Indication: Pain and weakness after a fall.

 

Comparison: None.

 

Findings:

 

Prominent hiatal hernia. The cardiomediastinal silhouette is at the upper 

limits of normal for size.

 

Increased interstitial markings bilaterally. Ill-defined haziness at the 

periphery of both lung bases. No lobar consolidation, pleural effusion or 

pneumothorax.

 

No free air seen under the diaphragm.

 

Impression:

 

1. Increased interstitial markings bilaterally and ill-defined haziness at

the periphery of both lung bases. The appearance is nonspecific but can be

seen with chronic parenchymal lung changes especially if there is a 

smoking history. No findings to suggest an organizing pneumonia.

2. Prominent hiatal hernia.

3. Upper limits of normal size of the cardiomediastinal silhouette.

 

Electronically signed by: JASON CHANG MD (10/31/2019 6:25 PM) [facility]

## 2019-11-05 NOTE — EKG
[facility] [address], [place] [address]

Test Date:    2019-10-31               Test Time:    17:47:18

Pat Name:     CYNTHIA EWING           Department:   

Patient ID:   [id number]           Room:          

Gender:       F                        Technician:   PEYTON

:          1960               Requested By: VIVI MARTINES

Order Number: [id number]            Reading MD:     

                                 Measurements

Intervals                              Axis          

Rate:         72                       P:            -57

RI:           120                      QRS:          2

QRSD:         94                       T:            60

QT:           452                                    

QTc:          497                                    

                           Interpretive Statements

SUPRAVENTRICULAR RHYTHM

PROLONGED QT

NO SPECIFIC ECG ABNORMALITIES

RI6.01

No previous ECG available for comparison

## 2019-12-16 ENCOUNTER — HOSPITAL ENCOUNTER (EMERGENCY)
Dept: HOSPITAL 63 - ER | Age: 59
Discharge: HOME | End: 2019-12-16
Payer: MEDICARE

## 2019-12-16 VITALS — SYSTOLIC BLOOD PRESSURE: 137 MMHG | DIASTOLIC BLOOD PRESSURE: 88 MMHG

## 2019-12-16 VITALS — BODY MASS INDEX: 18.33 KG/M2 | WEIGHT: 107.37 LBS | HEIGHT: 64 IN

## 2019-12-16 DIAGNOSIS — S60.413A: Primary | ICD-10-CM

## 2019-12-16 DIAGNOSIS — L03.90: ICD-10-CM

## 2019-12-16 DIAGNOSIS — K21.9: ICD-10-CM
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
## 2019-12-16 DIAGNOSIS — M19.90: ICD-10-CM

## 2019-12-16 DIAGNOSIS — E03.9: ICD-10-CM

## 2019-12-16 DIAGNOSIS — F15.10: ICD-10-CM
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
## 2019-12-16 DIAGNOSIS — F17.210: ICD-10-CM

## 2019-12-16 DIAGNOSIS — J44.9: ICD-10-CM
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
## 2019-12-16 PROCEDURE — 99283 EMERGENCY DEPT VISIT LOW MDM: CPT

## 2019-12-16 NOTE — PHYS DOC
Past History


Past Medical History:  Arthritis, Asthma, COPD, GERD, Hypothyroid


Additional Past Medical Histor:  something pt calls "precurser to parkinsons" 

caused by gabapentin


Past Surgical History:  No Surgical History


Smoking:  Cigarettes


Alcohol Use:  None


Drug Use:  Methamphetamine





Adult General


Chief Complaint


Chief Complaint:  SKIN RASH/ABSCESS





HPI


HPI





Patient is a 59-year-old female presents with sores on her left ear and 

bilateral arms. This started shortly after she used methamphetamine, 4 days ago.

No fever. No drainage from these areas. They are sore and red. She has not seen 

her primary physician for this. Symptoms are moderate in intensity. Nothing 

makes them better or worse. No relief with Neosporin ointment.[]





Review of Systems


Review of Systems





Constitutional: Denies fever or chills []


Eyes: Denies change in visual acuity, redness, or eye pain []


HENT: Denies nasal congestion or sore throat []


Respiratory: Denies hemoptysis or shortness of breath, cough is present. She is 

a smoker. []


Cardiovascular: Chest pain or palpitations[]


GI: Denies abdominal pain, nausea, vomiting, bloody stools or diarrhea []


: Denies dysuria or hematuria []


Musculoskeletal: Denies back pain or joint pain []


Integument: See history of present illness[]


Neurologic: Denies headache, focal weakness or sensory changes []


Endocrine: Denies polyuria or polydipsia []





All other systems were reviewed and found to be within normal limits, except as 

documented in this note.





Allergies


Allergies





Allergies








Coded Allergies Type Severity Reaction Last Updated Verified


 


  No Known Drug Allergies    10/22/19 No











Physical Exam


Physical Exam





Constitutional: Well developed, well nourished, no acute distress, non-toxic 

appearance. []


HENT: Normocephalic, atraumatic, bilateral external ears normal, oropharynx 

moist, no oral exudates, nose normal. []


Eyes: PERRLA, EOMI, conjunctiva normal, no discharge. [] 


Neck: Normal range of motion, no tenderness, supple, no stridor. [] 


Cardiovascular:Heart rate regular rhythm, no murmur []


Lungs & Thorax:  Bilateral breath sounds clear to auscultation, no rales rhonchi

 or wheezes, no increased work of breathing []


Abdomen: Bowel sounds normal, soft, no tenderness, no masses, no pulsatile 

masses. [] 


Skin: Warm, dry, erythema and excoriations on both volar surface of forearms. 

Also the 7 o'clock position reference to her left ear canal, going down to the 

earlobe. Also her left index finger appears swollen, there is an abrasion over 

the middle phalanx, dorsal surface, radial aspect. [] 


Back: No tenderness, no CVA tenderness. [] 


Extremities: No tenderness, no cyanosis, no clubbing, ROM intact, no edema. [] 


Neurologic: Alert and oriented X 3, normal motor function, normal sensory 

function, no focal deficits noted. []


Psychologic: Affect normal, judgement normal, mood normal. []





Current Patient Data


Vital Signs





                                   Vital Signs








  Date Time  Temp Pulse Resp B/P (MAP) Pulse Ox O2 Delivery O2 Flow Rate FiO2


 


12/16/19 21:20 98.9 87 20  100 Room Air  











EKG


EKG


[]





Radiology/Procedures


Radiology/Procedures


[]





Course & Med Decision Making


Course & Med Decision Making


Pertinent Labs and Imaging studies reviewed. (See chart for details)





ED course: Patient arrived, was placed in bed, and tolerated exam well. Findings

 and plan were discussed with the patient who voiced understanding. All 

questions were answered. She was discharged in improved condition.





Medical decision making: Patient with multiple areas of cellulitis, most likely 

is result of "picking" during her methamphetamine use. No evidence of systemic 

toxicity. No evidence of pneumonialungs are clear to auscultation. Will treat 

with antibiotics that will cover both lung issues as well as skin issues. No 

evidence of hypoxia.[]





Dragon Disclaimer


Dragon Disclaimer


This electronic medical record was generated, in whole or in part, using a voice

 recognition dictation system.





Departure


Departure:


Impression:  


   Primary Impression:  


   Cellulitis


   Additional Impressions:  


   Cough


   Methamphetamine abuse


Disposition:  01 HOME, SELF-CARE


Condition:  IMPROVED


Referrals:  


CHELSEA SWANSON (PCP)


Follow-up in 2 days


Patient Instructions:  Cellulitis, Cough, Adult, Methamphetamine Abuse, 

Complications





Additional Instructions:  


Follow-up up with your regular doctor in 2 days. Do not use methamphetamine or 

any other drugs or medicines that are not prescribed for you. They may kill you!

 Takes the medication as prescribed. Return to the ER if you develop a fever of 

more than 101, difficulty breathing, or any other concerns.


Scripts


D-Methorphan Hb/Prometh Hcl (PROMETHAZINE-DM SYRUP) 118 Ml Syrup


5 ML PO PRN Q4HRS for CONGESTION, #120 ML


   Prov: VIVI MARTINES DO         12/16/19 


Doxycycline Hyclate (DOXYCYCLINE HYCLATE) 100 Mg Tablet


1 TAB PO BID for skin infection, #20 TAB


   Prov: VIVI MARTINES DO         12/16/19





Problem Qualifiers








   Primary Impression:  


   Cellulitis


   Site of cellulitis:  unspecified site  Qualified Codes:  L03.90 - Cellulitis,

    unspecified








VIVI MARTINES DO          Dec 16, 2019 22:32

## 2020-04-27 ENCOUNTER — HOSPITAL ENCOUNTER (EMERGENCY)
Dept: HOSPITAL 63 - ER | Age: 60
Discharge: HOME | End: 2020-04-27
Payer: MEDICARE

## 2020-04-27 VITALS — HEIGHT: 64 IN | WEIGHT: 102.07 LBS | BODY MASS INDEX: 17.43 KG/M2

## 2020-04-27 VITALS — DIASTOLIC BLOOD PRESSURE: 81 MMHG | SYSTOLIC BLOOD PRESSURE: 132 MMHG

## 2020-04-27 DIAGNOSIS — J44.9: ICD-10-CM

## 2020-04-27 DIAGNOSIS — L24.9: ICD-10-CM

## 2020-04-27 DIAGNOSIS — K21.9: ICD-10-CM
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
## 2020-04-27 DIAGNOSIS — M19.90: ICD-10-CM
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
## 2020-04-27 DIAGNOSIS — E03.9: ICD-10-CM
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
## 2020-04-27 DIAGNOSIS — F17.210: ICD-10-CM

## 2020-04-27 DIAGNOSIS — S01.81XA: Primary | ICD-10-CM
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
## 2020-04-27 PROCEDURE — 12013 RPR F/E/E/N/L/M 2.6-5.0 CM: CPT

## 2020-04-27 PROCEDURE — 99282 EMERGENCY DEPT VISIT SF MDM: CPT

## 2020-04-27 NOTE — PHYS DOC
Past History


Past Medical History:  Arthritis, Asthma, COPD, GERD, Hypothyroid


Additional Past Medical Histor:  something pt calls "precurser to parkinsons" 

caused by gabapentin


Past Surgical History:  Other


Additional Past Surgical Histo:  HERNIA


Smoking:  Cigarettes


Alcohol Use:  None


Drug Use:  None





General Adult


EDM:


Chief Complaint:  LACERATION/AVULSION





HPI:


HPI:





59-year-old female presents with report of mechanical trip and fall when patient

had gotten up to use restroom at 0500 with subsequent left forehead laceration 

after hitting corner of her wall. Denies loss of consciousness. Denies nausea or

vomiting. Denies neck pain. Patient denies use of blood thinners. Reports last 

tetanus booster in October 2019. Patient was seen by her boyfriend this 

afternoon who felt patient may require suture repair. Patient denies other 

complaint.





Review of Systems:


Review of Systems:





Constitutional: Denies fever or chills 


Eyes: Denies redness or eye pain 


HENT: Denies nasal congestion or sore throat


Respiratory: Denies cough or shortness of breath 


Cardiovascular: Denies chest pain or palpitations


GI: Denies abdominal pain, nausea, or vomiting


: Denies dysuria or hematuria


Musculoskeletal: Denies back pain or joint pain


Integument: Reports left forehead laceration


Neurologic: Denies headache, focal weakness or sensory changes





Complete systems were reviewed and found to be within normal limits, except as 

documented in this note.





Current Medications:


Current Meds:





Current Medications








 Medications


  (Trade)  Dose


 Ordered  Sig/Chloe  Start Time


 Stop Time Status Last Admin


Dose Admin


 


 Lidocaine/


 Epinephrine


  (Xylocaine


 2%-Epi 1:100,000)  20 ml  1X  ONCE  4/27/20 15:30


 4/27/20 15:45 DC  





 


 Neomycin/


 Polymyxin/


 Bacitracin


  (Triple


 Antibiotic


 Ointment)  1 pkt  1X  ONCE  4/27/20 15:30


 4/27/20 15:45 DC  














Allergies:


Allergies:





Allergies








Coded Allergies Type Severity Reaction Last Updated Verified


 


  No Known Drug Allergies    10/22/19 No











Physical Exam:


PE:





Constitutional: Well developed, well nourished, no acute distress, non-toxic 

appearance


HENT: Normocephalic, 3cm vertical laceration to left forehead extending to 

hairline


Eyes: PERRL, EOMI, conjunctiva normal, no discharge, no nystagmus


Neck: Normal range of motion, no midline tenderness, supple


Lungs & Thorax:  No respiratory distress, equal chest rise and fall


Abdomen: Soft, no tenderness


Skin: Warm, dry, no erythema, no rash, bilateral hand dryness noted, joints 

enlarged consistent with arthritis


Back: No tenderness, no CVA tenderness


Extremities: No tenderness, ROM intact, no edema


Neurologic: Alert and oriented X 3, normal motor function, normal sensory 

function, no focal deficits noted


Psychologic: Affect normal, judgment normal





Current Patient Data:


Vital Signs:





                                   Vital Signs








  Date Time  Temp Pulse Resp B/P (MAP) Pulse Ox O2 Delivery O2 Flow Rate FiO2


 


4/27/20 15:05 97.9 97 20 132/81 (98) 97 Room Air  











EKG:


EKG:


[]





Radiology/Procedures:


Radiology/Procedures:


[]





Course & Med Decision Making:


Course & Med Decision Making





Patient presents with mechanical slip and fall early this morning with 

laceration to left forehead. Patient neurologically intact. No midline cervical 

spine tenderness noted. Tetanus up-to-date. Wound cleaned and repaired with 

sutures. Dressing applied.





Patient also with hand dryness secondary to frequent cleaning. Advised to use 

Aquaphor.





Patient stable for discharge with outpatient follow-up with PCP. Discussed 

findings and plan with patient, who acknowledges understanding and agreement.





Dragon Disclaimer:


Dragon Disclaimer:


This electronic medical record was generated, in whole or in part, using a voice

 recognition dictation system.





Laceration/Wound Repair


Laceration/Wound Repair :  


   Wound Location:  head


   Wound's Depth, Shape:  linear


   Wound Length (cm):  3


   Wound Explored:  no foreign body removed


   Irrigated w/ Saline (ccs):  100


   Betadine Prep?:  Yes


   Anesthesia:  Lidocaine w/ Epi (2%)


   Volume Anesthetic (ccs):  3


   Wound Debrided:  minimal


   Wound Repaired With:  sutures


   Suture Size/Type:  6:0, nylon


   Number of Sutures:  5


   Sterile Dressing Applied?:  Yes


Progress


Verbal consent obtained. Time out performed. Hand hygiene utilized. Wound 

cleaned with Betadine.  Anesthesia obtained via a 25-gauge hypodermic needle 

with (3) mL's of lidocaine 2% with epinephrine. Wound well approximated with 6-0

 Nylon x 5 simple interrupted sutures. Patient tolerated procedure well and 

without difficulty. Empiric antibiotic ointment applied prior to sterile 

dressing.





Departure


Departure:


Impression:  


   Primary Impression:  


   Forehead laceration


   Qualified Codes:  S01.81XA - Laceration without foreign body of other part of

    head, initial encounter


   Additional Impression:  


   Irritant hand dermatitis


Disposition:  01 HOME, SELF-CARE


Condition:  STABLE


Referrals:  


CHELSEA SWANSON (PCP)


Patient Instructions:  Hand Dermatitis, Easy-to-Read, Laceration Care, Adult, 

Easy-to-Read





Additional Instructions:  


Do not soak your wound.  You may shower.  Clean wound daily with soap and water.

  Change dressing 2 times daily.  Use over the counter antibiotic ointment with 

each dressing change.





Sutures need to be removed in 5 days. Present to your family doctor or local 

urgent care for removal.  You may also present to the ED but it will be an 

additional visit/charge.





After suture removal you may use Vitamin E ointment to soften the wound and 

prevent scarring.





USE Aquaphor for your hand dryness.





Use over the counter Tylenol and/or Ibuprofen for pain or discomfort.











MELANIE FOUNTAIN DO             Apr 27, 2020 16:49

## 2020-05-07 ENCOUNTER — HOSPITAL ENCOUNTER (EMERGENCY)
Dept: HOSPITAL 63 - ER | Age: 60
Discharge: HOME | End: 2020-05-07
Payer: MEDICARE

## 2020-05-07 VITALS — HEIGHT: 64 IN | BODY MASS INDEX: 18.1 KG/M2 | WEIGHT: 106.04 LBS

## 2020-05-07 VITALS — SYSTOLIC BLOOD PRESSURE: 130 MMHG | DIASTOLIC BLOOD PRESSURE: 80 MMHG

## 2020-05-07 DIAGNOSIS — J44.9: ICD-10-CM

## 2020-05-07 DIAGNOSIS — K21.9: ICD-10-CM

## 2020-05-07 DIAGNOSIS — S01.81XD: Primary | ICD-10-CM

## 2020-05-07 DIAGNOSIS — E03.9: ICD-10-CM

## 2020-05-07 DIAGNOSIS — F17.210: ICD-10-CM
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
## 2020-05-07 DIAGNOSIS — M25.562: ICD-10-CM

## 2020-05-07 PROCEDURE — 73562 X-RAY EXAM OF KNEE 3: CPT

## 2020-05-07 PROCEDURE — 99284 EMERGENCY DEPT VISIT MOD MDM: CPT

## 2020-05-07 NOTE — PHYS DOC
Past History


Past Medical History:  Arthritis, Asthma, COPD, GERD, Hypothyroid


Additional Past Medical Histor:  something pt calls "precurser to parkinsons" 

caused by gabapentin


Past Surgical History:  Other


Additional Past Surgical Histo:  HERNIA


Smoking:  Cigarettes


Alcohol Use:  None


Drug Use:  None





General Adult


EDM:


Chief Complaint:  SUTURE/STAPLE REMOVAL





HPI:


HPI:


59-year-old female presents for suture removal from her forehead.  While the 

patient was walking to her room, she fell onto the floor.  She is complaining 

left knee pain and still wants her staples out.  Patient is able to walk.  She 

has no other complaints this time.





Review of Systems:


Review of Systems:


Constitutional:  Denies fever or chills 


Eyes:  Denies change in visual acuity 


HENT:  Denies nasal congestion or sore throat 


Respiratory:  Denies cough or shortness of breath 


Cardiovascular:  Denies chest pain or edema 


GI:  Denies abdominal pain, nausea, vomiting, bloody stools or diarrhea 


:  Denies dysuria 


Musculoskeletal: Left knee pain


Integument: Sutures in the forehead


Neurologic:  Denies headache, focal weakness or sensory changes 


Endocrine:  Denies polyuria or polydipsia 


Lymphatic:  Denies swollen glands 


Psychiatric:  Denies depression or anxiety





Heart Score:


Risk Factors:


Risk Factors:  DM, Current or recent (<one month) smoker, HTN, HLP, family 

history of CAD, obesity.


Risk Scores:


Score 0 - 3:  2.5% MACE over next 6 weeks - Discharge Home


Score 4 - 6:  20.3% MACE over next 6 weeks - Admit for Clinical Observation


Score 7 - 10:  72.7% MACE over next 6 weeks - Early Invasive Strategies





Allergies:


Allergies:





Allergies








Coded Allergies Type Severity Reaction Last Updated Verified


 


  No Known Drug Allergies    10/22/19 No











Physical Exam:


PE:





Constitutional: Well developed, well nourished, no acute distress, non-toxic 

appearance. []


HENT: Normocephalic, atraumatic, bilateral external ears normal, oropharynx 

moist, no oral exudates, nose normal. []


Eyes: PERRLA, EOMI, conjunctiva normal, no discharge. [] 


Neck: Normal range of motion, no tenderness, supple, no stridor. [] 


Cardiovascular:Heart rate regular rhythm, no murmur []


Lungs & Thorax:  Bilateral breath sounds clear to auscultation []


Abdomen: Bowel sounds normal, soft, no tenderness, no masses, no pulsatile 

masses. [] 


Skin: Well-healed laceration of the forehead with sutures in place.  [] 


Back: No tenderness, no CVA tenderness. [] 


Extremities: No tenderness, no cyanosis, no clubbing, ROM intact, no edema. [] 


Neurologic: Alert and oriented X 3, normal motor function, normal sensory 

function, no focal deficits noted. []


Psychologic: Affect normal, judgement normal, mood normal. []





EKG:


EKG:


[]





Radiology/Procedures:


Radiology/Procedures:


[]


Impressions:


EXAM: Left knee, 4 views.


 


HISTORY: Pain.


 


COMPARISON: None.


 


FINDINGS: 4 views of the left knee are obtained. There is mild medial 


compartment joint space narrowing and spurring. There is trace joint 


fluid. There is minimal enthesopathy at the Achilles tendon insertion.


 


IMPRESSION: Mild medial compartment predominant osteoarthritis of the left


knee. 


 


Electronically signed by: Lisa Benz MD (5/7/2020 3:56 PM) [id number]














DICTATED AND SIGNED BY:     LISA BENZ MD


DATE:     05/07/20 1556





CC: ARASH GARRETT DO; CHELSEA SWANSON ~





Course & Med Decision Making:


Course & Med Decision Making


Pertinent Labs and Imaging studies reviewed. (See chart for details)





[]





Dragon Disclaimer:


Dragon Disclaimer:


This electronic medical record was generated, in whole or in part, using a voice

 recognition dictation system.





Departure


Departure:


Impression:  


   Primary Impression:  


   Encounter for removal of sutures


   Additional Impression:  


   Fall from slip, trip, or stumble


   Qualified Codes:  W01.0XXA - Fall on same level from slipping, tripping and 

   stumbling without subsequent striking against object, initial encounter


Disposition:  01 HOME, SELF-CARE


Condition:  STABLE


Referrals:  


CHELSEA SWANSON (PCP)


Patient Instructions:  Suture Removal-Brief











ARASH GARRETT DO                  May 7, 2020 16:02

## 2020-05-07 NOTE — RAD
EXAM: Left knee, 4 views.

 

HISTORY: Pain.

 

COMPARISON: None.

 

FINDINGS: 4 views of the left knee are obtained. There is mild medial 

compartment joint space narrowing and spurring. There is trace joint 

fluid. There is minimal enthesopathy at the Achilles tendon insertion.

 

IMPRESSION: Mild medial compartment predominant osteoarthritis of the left

knee. 

 

Electronically signed by: Lisa Benz MD (5/7/2020 3:56 PM) [id number]

## 2020-08-12 ENCOUNTER — HOSPITAL ENCOUNTER (EMERGENCY)
Dept: HOSPITAL 63 - ER | Age: 60
Discharge: HOME | End: 2020-08-12
Payer: MEDICARE

## 2020-08-12 VITALS — WEIGHT: 94.8 LBS | HEIGHT: 64 IN | BODY MASS INDEX: 16.18 KG/M2

## 2020-08-12 VITALS
DIASTOLIC BLOOD PRESSURE: 84 MMHG | SYSTOLIC BLOOD PRESSURE: 113 MMHG | SYSTOLIC BLOOD PRESSURE: 113 MMHG | DIASTOLIC BLOOD PRESSURE: 84 MMHG

## 2020-08-12 DIAGNOSIS — F17.210: ICD-10-CM

## 2020-08-12 DIAGNOSIS — R44.3: Primary | ICD-10-CM

## 2020-08-12 DIAGNOSIS — J44.9: ICD-10-CM

## 2020-08-12 DIAGNOSIS — M19.90: ICD-10-CM

## 2020-08-12 LAB
AMPHETAMINE/METHAMPHETAMINE: (no result)
BARBITURATES UR-MCNC: (no result) UG/ML
BENZODIAZ UR-MCNC: (no result) UG/L
CANNABINOIDS UR-MCNC: (no result) UG/L
COCAINE UR-MCNC: (no result) NG/ML
METHADONE SERPL-MCNC: (no result) NG/ML
OPIATES UR-MCNC: (no result) NG/ML
PCP SERPL-MCNC: (no result) MG/DL

## 2020-08-12 PROCEDURE — 99283 EMERGENCY DEPT VISIT LOW MDM: CPT

## 2020-08-12 PROCEDURE — 80307 DRUG TEST PRSMV CHEM ANLYZR: CPT

## 2020-08-12 PROCEDURE — 36415 COLL VENOUS BLD VENIPUNCTURE: CPT

## 2020-08-12 NOTE — PHYS DOC
Past History


Past Medical History:  Arthritis, Asthma, COPD


Additional Past Medical Histor:  something pt calls "precurser to parkinsons" 

caused by gabapentin


 (NEETA MICHELLE MD)


Past Surgical History:  Other


Additional Past Surgical Histo:  umbilical hernia


 (NEETA MICHELLE MD)


Smoking:  Cigarettes


Alcohol Use:  None


Drug Use:  None


 (NEETA MICHELLE MD)





Adult General


Chief Complaint


Chief Complaint:  OTHER COMPLAINTS





HPI


HPI





Patient is a 59 year old female who presents with hallucinations.  Patient was 

sent down here by her primary care physician who was concerned.  She saw her 

earlier today and at that time she was normal.  Patient states that she has bugs

coming out of her and that no one will help her.  History is very limited.


 (NEETA MICHELLE MD)





Review of Systems


Review of Systems


General: Denies fever, chills, sweats, fatigue


Eyes: Denies drainage, blurred vision, eye redness


HENT: Denies rhinorrhea, sore throat, earache


Respiratory: Denies cough, shortness of breath, wheezing


Cardiac: Denies edema, palpitations, chest pain


GI: Denies abdominal pain, Nausea, vomiting


MSK: Denies back pain, neck pain


Skin: Denies rash, jaundice


Neuro: Denies headache, dizziness


Psychiatric: Denies SI/HI


 (NEETA MICHELLE MD)





Current Medications


Current Medications





Current Medications








 Medications


  (Trade)  Dose


 Ordered  Sig/Chloe  Start Time


 Stop Time Status Last Admin


Dose Admin


 


 Lorazepam


  (Ativan)  1 mg  1X  ONCE  8/12/20 17:30


 8/12/20 17:31 DC 8/12/20 17:39


1 MG








 (NEETA MICHELLE MD)





Allergies


Allergies





Allergies








Coded Allergies Type Severity Reaction Last Updated Verified


 


  No Known Drug Allergies    10/22/19 No








 (NEETA MICHELLE MD)





Physical Exam


Physical Exam


General: Awake, alert, anxious, moving around chaotic lady


HEENT: Atraumatic, EOMI, PERRL, airway patent, moist oral mucosa


Neck: Supple, trachea midline


Respiratory: CTA bilaterally, normal effort, no wheezing/crackles


CV: RRR, no murmur, cap refill <2


GI: Soft, nondistended, nontender, no masses


MSK: No obvious deformities


Skin: Warm, dry, intact


Neuro: A&O x3, speech NL, sensory and motor grossly intact, no focal deficits


Psych: Hallucinating, not suicidal or homicidal


 (NEETA MICHELLE MD)





Current Patient Data


Vital Signs





                                   Vital Signs








  Date Time  Temp Pulse Resp B/P (MAP) Pulse Ox O2 Delivery O2 Flow Rate FiO2


 


8/12/20 17:10 98.1 94 18 113/84 (94) 97 Room Air  








 (NEETA MICHELLE MD)


Lab Results





Laboratory Tests








Test


 8/12/20


18:30


 


Urine Opiates Screen Pos 


 


Urine Methadone Screen Neg 


 


Urine Barbiturates Neg 


 


Urine Phencyclidine Screen Neg 


 


Urine


Amphetamine/Methamphetamine Pos 





 


Urine Benzodiazepines Screen Neg 


 


Urine Cocaine Screen Neg 


 


Urine Cannabinoids Screen Neg 


 


Urine Ethyl Alcohol Neg 








Current Medications








 Medications


  (Trade)  Dose


 Ordered  Sig/Chloe


 Route


 PRN Reason  Start Time


 Stop Time Status Last Admin


Dose Admin


 


 Lorazepam


  (Ativan)  1 mg  1X  ONCE


 PO


   8/12/20 17:30


 8/12/20 17:31 DC 8/12/20 17:39











 (DARA GARCÍA DO)


EKG


EKG


[]


 (NEETA MICHELLE MD)





Radiology/Procedures


Radiology/Procedures


[]


 (NEETA MICHELLE MD)





Course & Med Decision Making


Course & Med Decision Making


Pertinent Labs and Imaging studies reviewed. (See chart for details)





Patient is a 59-year-old female who presents to the emergency room complaining 

of bugs coming out of her.  There is no bugs apparent on exam.  It is likely 

that this is drug-induced.  Drug screen will be ordered.  Patient was discussed 

with oncoming physician who will assume care.


 (NEETA MICHELLE MD)


Course & Med Decision Making


Comprehensive signout given by daytime physician, I saw and evaluated patient 

and agree with previous physician's documentation of HPI and physical exam find

ings


Agreed with decision to obtain urine drug screen, this was positive for 

amphetamines


Patient's presentation classic for methamphetamine side effects, appears under 

the influence and tweaking currently


Hemodynamically stable, has right at this time, her partner whom she lives with.


Discussed importance of methamphetamine cessation at length, patient continued 

to deny active use


Discussed limited utility in further work-up/diagnostic testing in the emergency

 department


Patient was ultimately discharged home in stable condition.  Strict return 

precautions were discussed, all questions and concerns addressed


 (DARA GARCÍA DO)


Dragon Disclaimer


Dragon Disclaimer


This electronic medical record was generated, in whole or in part, using a voice

 recognition dictation system.


 (NEETA MICHELLE MD)





Departure


Departure:


Impression:  


   Primary Impression:  


   Hallucinations


   Additional Impression:  


   Positive urine drug screen


Disposition:  01 HOME/RESIDENCE PRIOR TO ADM


Condition:  STABLE


Referrals:  


CHELSEA SWANSON (PCP)


Patient Instructions:  Methamphetamine Abuse, Complications





Justification of Admission:


Justification of Admission:


Justification of Admission Dx:  Yes


 (NEETA MICHELEL MD)


Justification of Admission Dx:  N/A


 (DARA GARCÍA DO)





Problem Qualifiers











NEETA MICHELLE MD              Aug 12, 2020 17:50


DARA GARCÍA DO                 Aug 12, 2020 19:03

## 2021-08-05 ENCOUNTER — HOSPITAL ENCOUNTER (OUTPATIENT)
Dept: HOSPITAL 63 - RAD | Age: 61
End: 2021-08-05
Attending: FAMILY MEDICINE
Payer: MEDICARE

## 2021-08-05 DIAGNOSIS — R92.1: ICD-10-CM

## 2021-08-05 DIAGNOSIS — Z12.31: Primary | ICD-10-CM

## 2021-08-05 DIAGNOSIS — Z78.0: ICD-10-CM

## 2021-08-05 PROCEDURE — 77063 BREAST TOMOSYNTHESIS BI: CPT

## 2021-08-05 PROCEDURE — 77080 DXA BONE DENSITY AXIAL: CPT

## 2021-08-05 PROCEDURE — 77067 SCR MAMMO BI INCL CAD: CPT

## 2021-08-05 NOTE — RAD
EXAM: DUAL ENERGY X-RAY ABSORPTIOMETRY (DEXA).



HISTORY: Postmenopausal screening.



FINDINGS: The lowest measured T-score is -2.2 in the right hip, based on a bone mineral density of 0.
685 g/cm^2. Refer to the worksheets for full detail.



No comparison examinations are available.



IMPRESSION:

1. Low bone mass. Bone mineral density yields a T-score between -1.0 and -2.5. Fracture risk is incre
ased.

2. FRAX report: Not calculated.





METHODOLOGY: Dual energy x-ray absorptiometry was performed to measure bone mineral density. The foll
owing analysis is based on the 2019 Official Positions of the International Society for Clinical Dens
itometry: 



Measurements of the hips and the average of L1-L4 are preferred. When the spine and/or hip cannot be 
feasibly measured or interpreted, or in the setting of hyperparathyroidism, distal radial bone minera
l density may be measured. 



The lumbar spine T-score is based on the average bone mineral density of L1-L4. In the setting of art
ifact or anatomic abnormality, some lumbar levels may be excluded, and the remaining levels used for 
calculation. A single lumbar level is not used for diagnosis, and if only a single level is available
 for assessment, another anatomic site will be used to assign a diagnosis.



The hip T-score is based on the bone mineral density measurement of the femoral neck or total proxima
l femur of either side, whichever is lowest. Bilateral mean values are not used for diagnosis.



The forearm T-score is derived from 33% of the distal radius of the nondominant forearm.



Electronically signed by: Lisa Benz MD (8/5/2021 12:18 PM) [id number]

## 2021-08-09 NOTE — RAD
EXAM: BILATERAL DIGITAL 3D SCREENING MAMMOGRAPHY.



HISTORY: Routine mammographic screening. 



TECHNIQUE: Bilateral digital 3D and tomographic images were obtained in CC and MLO projections. Compu
ter-aided detection was applied.



COMPARISON: None available. This is interpreted as a baseline study.



COMPOSITION: D. The breasts are extremely dense, which lowers the sensitivity of mammography.



FINDINGS: There is a small cluster of calcifications medially and inferiorly on the left. See annotat
ions.



Coarse calcifications on the right are stable. There is no suspicious finding on the right.



BI-RADS CATEGORY 0: Incomplete--Needs Additional Imaging Evaluation.



RECOMMENDATION:

1. Magnification of a small cluster of calcifications inferomedially on the left.



Electronically signed by: RENE Sharp MD (8/9/2021 4:36 PM) [id number]